# Patient Record
Sex: MALE | Race: WHITE
[De-identification: names, ages, dates, MRNs, and addresses within clinical notes are randomized per-mention and may not be internally consistent; named-entity substitution may affect disease eponyms.]

---

## 2019-07-11 ENCOUNTER — HOSPITAL ENCOUNTER (EMERGENCY)
Dept: HOSPITAL 76 - ED | Age: 39
Discharge: HOME | End: 2019-07-11
Payer: COMMERCIAL

## 2019-07-11 VITALS — SYSTOLIC BLOOD PRESSURE: 118 MMHG | DIASTOLIC BLOOD PRESSURE: 72 MMHG

## 2019-07-11 DIAGNOSIS — R05: Primary | ICD-10-CM

## 2019-07-11 DIAGNOSIS — R09.81: ICD-10-CM

## 2019-07-11 PROCEDURE — 99284 EMERGENCY DEPT VISIT MOD MDM: CPT

## 2019-07-11 PROCEDURE — 99282 EMERGENCY DEPT VISIT SF MDM: CPT

## 2019-07-11 NOTE — ED PHYSICIAN DOCUMENTATION
PD HPI HEENT





- Stated complaint


Stated Complaint: COUGHING, SORE THROAT





- Chief complaint


Chief Complaint: Heent





- History obtained from


History obtained from: Patient





- History of Present Illness


Timing - onset: How many weeks ago (2)


Timing - duration: Weeks (2)


Timing - details: Gradual onset


Location: Nose, Throat


Improves: Nothing


Associated symptoms: Congestion, Rhinorrhea, Other (Postnasal drip).  No: Fever


Similar symptoms before: No diagnosis


Recently seen: Not recently seen





- Additional information


Additional information: 





This is a 38-year-old who presents with complaints that he had a cough for the 

past 2 weeks and is bringing up yellow phlegm.  He is intermittently had a 

stuffy nose and sore throat with some postnasal drainage.  Denies history of 

allergies.  He has not taken any medications during this 2-week timeframe until 

today when he tried some Mucinex and it did seem to help a little bit.  Denies 

earache or fever.  No nausea or vomiting.  He works as a .  Denies use 

of tobacco.





Review of Systems


Constitutional: denies: Fever


Ears: denies: Ear pain


Nose: reports: Other (Postnasal drip)


Throat: reports: Sore throat (Intermittent)


Respiratory: reports: Cough.  denies: Dyspnea, Hemoptysis


GI: denies: Nausea, Vomiting





PD PAST MEDICAL HISTORY





- Past Medical History


Past Medical History: No


Cardiovascular: None


Respiratory: None


Neuro: None


Endocrine/Autoimmune: None


GI: None


: None


HEENT: None


Psych: None


Musculoskeletal: None


Derm: None





- Past Surgical History


Past Surgical History: No





- Present Medications


Home Medications: 


                                Ambulatory Orders











 Medication  Instructions  Recorded  Confirmed


 


Azithromycin [Zithromax] 0 mg PO DAILY #6 tablet 07/11/19 


 


Benzonatate [Tessalon Perle] 100 - 200 mg PO TID PRN #30 capsule 07/11/19 














- Allergies


Allergies/Adverse Reactions: 


                                    Allergies











Allergy/AdvReac Type Severity Reaction Status Date / Time


 


No Known Drug Allergies Allergy   Verified 07/11/19 18:35














- Social History


Does the pt smoke?: No


Smoking Status: Never smoker


Does the pt drink ETOH?: Yes


Does the pt have substance abuse?: No





- Immunizations


Immunizations are current?: Yes





- POLST


Patient has POLST: No





PD ED PE NORMAL





- Vitals


Vital signs reviewed: Yes





- General


General: Alert and oriented X 3, No acute distress, Well developed/nourished





- HEENT


HEENT: Atraumatic, PERRL, Ears normal, Moist mucous membranes, Pharynx benign





- Neck


Neck: Supple, no meningeal sign, No adenopathy





- Cardiac


Cardiac: RRR, No murmur





- Respiratory


Respiratory: No respiratory distress, Clear bilaterally





Results





- Vitals


Vitals: 





                               Vital Signs - 24 hr











  07/11/19





  18:33


 


Temperature 37.0 C


 


Heart Rate 72


 


Respiratory 19





Rate 


 


Blood Pressure 126/71


 


O2 Saturation 97








                                     Oxygen











O2 Source                      Room air

















PD MEDICAL DECISION MAKING





- ED course


Complexity details: d/w patient


ED course: 





Patient symptoms are most likely related to postnasal drip.  He has not tried 

any allergy or decongestant medications which I would recommend that he try 

initially.  We will also prescribe a Tessalon for the cough.  He will be 

provided a back-up prescription for Z-Stacey if he is not improving with the above 

measures.





Departure





- Departure


Disposition: 01 Home, Self Care


Clinical Impression: 


 Cough, Nasal congestion





Condition: Good


Instructions:  ED Allergy Nasal, ED Upper Resp Infec No  Abx Tx


Prescriptions: 


Azithromycin [Zithromax] 0 mg PO DAILY #6 tablet


Benzonatate [Tessalon Perle] 100 - 200 mg PO TID PRN #30 capsule


 PRN Reason: Cough


Comments: 


Take an allergy medicine such as Claritin over-the-counter for the next several 

days.  May also use a decongestant like Sudafed to see if that is effective at 

stopping the postnasal dripping.  Prescription for Tessalon is for coughing as 

needed.  I have provided a back-up prescription for Zithromax Z-STACEY if your 

symptoms are not improving over the next 3 to 4 days with adequate treatment for

the allergies you can begin taking the Z-Stacey.